# Patient Record
Sex: FEMALE | Race: OTHER | HISPANIC OR LATINO | URBAN - METROPOLITAN AREA
[De-identification: names, ages, dates, MRNs, and addresses within clinical notes are randomized per-mention and may not be internally consistent; named-entity substitution may affect disease eponyms.]

---

## 2021-12-27 ENCOUNTER — EMERGENCY (EMERGENCY)
Facility: HOSPITAL | Age: 36
LOS: 1 days | Discharge: HOME | End: 2021-12-27
Attending: EMERGENCY MEDICINE
Payer: MEDICAID

## 2021-12-27 VITALS — DIASTOLIC BLOOD PRESSURE: 53 MMHG | SYSTOLIC BLOOD PRESSURE: 101 MMHG | HEART RATE: 79 BPM | OXYGEN SATURATION: 98 %

## 2021-12-27 VITALS
TEMPERATURE: 100 F | RESPIRATION RATE: 18 BRPM | DIASTOLIC BLOOD PRESSURE: 59 MMHG | SYSTOLIC BLOOD PRESSURE: 98 MMHG | HEIGHT: 59 IN | HEART RATE: 70 BPM | WEIGHT: 100.09 LBS | OXYGEN SATURATION: 96 %

## 2021-12-27 LAB
ANION GAP SERPL CALC-SCNC: 12 MMOL/L — SIGNIFICANT CHANGE UP (ref 5–17)
BASOPHILS # BLD AUTO: 0 K/UL — SIGNIFICANT CHANGE UP (ref 0–0.2)
BASOPHILS NFR BLD AUTO: 0 % — SIGNIFICANT CHANGE UP (ref 0–2)
BUN SERPL-MCNC: 13 MG/DL — SIGNIFICANT CHANGE UP (ref 8–20)
CALCIUM SERPL-MCNC: 8.4 MG/DL — LOW (ref 8.6–10.2)
CHLORIDE SERPL-SCNC: 99 MMOL/L — SIGNIFICANT CHANGE UP (ref 98–107)
CO2 SERPL-SCNC: 24 MMOL/L — SIGNIFICANT CHANGE UP (ref 22–29)
CREAT SERPL-MCNC: 0.56 MG/DL — SIGNIFICANT CHANGE UP (ref 0.5–1.3)
EOSINOPHIL # BLD AUTO: 0 K/UL — SIGNIFICANT CHANGE UP (ref 0–0.5)
EOSINOPHIL NFR BLD AUTO: 0 % — SIGNIFICANT CHANGE UP (ref 0–6)
GLUCOSE SERPL-MCNC: 126 MG/DL — HIGH (ref 70–99)
HCG SERPL-ACNC: <4 MIU/ML — SIGNIFICANT CHANGE UP
HCT VFR BLD CALC: 35.4 % — SIGNIFICANT CHANGE UP (ref 34.5–45)
HGB BLD-MCNC: 12.2 G/DL — SIGNIFICANT CHANGE UP (ref 11.5–15.5)
IMM GRANULOCYTES NFR BLD AUTO: 0.4 % — SIGNIFICANT CHANGE UP (ref 0–1.5)
LYMPHOCYTES # BLD AUTO: 0.88 K/UL — LOW (ref 1–3.3)
LYMPHOCYTES # BLD AUTO: 18.1 % — SIGNIFICANT CHANGE UP (ref 13–44)
MCHC RBC-ENTMCNC: 30.2 PG — SIGNIFICANT CHANGE UP (ref 27–34)
MCHC RBC-ENTMCNC: 34.5 GM/DL — SIGNIFICANT CHANGE UP (ref 32–36)
MCV RBC AUTO: 87.6 FL — SIGNIFICANT CHANGE UP (ref 80–100)
MONOCYTES # BLD AUTO: 0.19 K/UL — SIGNIFICANT CHANGE UP (ref 0–0.9)
MONOCYTES NFR BLD AUTO: 3.9 % — SIGNIFICANT CHANGE UP (ref 2–14)
NEUTROPHILS # BLD AUTO: 3.77 K/UL — SIGNIFICANT CHANGE UP (ref 1.8–7.4)
NEUTROPHILS NFR BLD AUTO: 77.6 % — HIGH (ref 43–77)
PLATELET # BLD AUTO: 195 K/UL — SIGNIFICANT CHANGE UP (ref 150–400)
POTASSIUM SERPL-MCNC: 3.8 MMOL/L — SIGNIFICANT CHANGE UP (ref 3.5–5.3)
POTASSIUM SERPL-SCNC: 3.8 MMOL/L — SIGNIFICANT CHANGE UP (ref 3.5–5.3)
RBC # BLD: 4.04 M/UL — SIGNIFICANT CHANGE UP (ref 3.8–5.2)
RBC # FLD: 11.7 % — SIGNIFICANT CHANGE UP (ref 10.3–14.5)
SARS-COV-2 RNA SPEC QL NAA+PROBE: DETECTED
SODIUM SERPL-SCNC: 135 MMOL/L — SIGNIFICANT CHANGE UP (ref 135–145)
WBC # BLD: 4.86 K/UL — SIGNIFICANT CHANGE UP (ref 3.8–10.5)
WBC # FLD AUTO: 4.86 K/UL — SIGNIFICANT CHANGE UP (ref 3.8–10.5)

## 2021-12-27 PROCEDURE — U0003: CPT

## 2021-12-27 PROCEDURE — 93010 ELECTROCARDIOGRAM REPORT: CPT

## 2021-12-27 PROCEDURE — U0005: CPT

## 2021-12-27 PROCEDURE — 96374 THER/PROPH/DIAG INJ IV PUSH: CPT

## 2021-12-27 PROCEDURE — 99284 EMERGENCY DEPT VISIT MOD MDM: CPT | Mod: 25

## 2021-12-27 PROCEDURE — 85025 COMPLETE CBC W/AUTO DIFF WBC: CPT

## 2021-12-27 PROCEDURE — 80048 BASIC METABOLIC PNL TOTAL CA: CPT

## 2021-12-27 PROCEDURE — 36415 COLL VENOUS BLD VENIPUNCTURE: CPT

## 2021-12-27 PROCEDURE — 84702 CHORIONIC GONADOTROPIN TEST: CPT

## 2021-12-27 PROCEDURE — 71045 X-RAY EXAM CHEST 1 VIEW: CPT

## 2021-12-27 PROCEDURE — 93005 ELECTROCARDIOGRAM TRACING: CPT

## 2021-12-27 PROCEDURE — 71045 X-RAY EXAM CHEST 1 VIEW: CPT | Mod: 26

## 2021-12-27 PROCEDURE — 99284 EMERGENCY DEPT VISIT MOD MDM: CPT

## 2021-12-27 RX ORDER — KETOROLAC TROMETHAMINE 30 MG/ML
30 SYRINGE (ML) INJECTION ONCE
Refills: 0 | Status: DISCONTINUED | OUTPATIENT
Start: 2021-12-27 | End: 2021-12-27

## 2021-12-27 RX ORDER — SODIUM CHLORIDE 9 MG/ML
1000 INJECTION INTRAMUSCULAR; INTRAVENOUS; SUBCUTANEOUS ONCE
Refills: 0 | Status: COMPLETED | OUTPATIENT
Start: 2021-12-27 | End: 2021-12-27

## 2021-12-27 RX ADMIN — Medication 30 MILLIGRAM(S): at 04:08

## 2021-12-27 RX ADMIN — SODIUM CHLORIDE 1000 MILLILITER(S): 9 INJECTION INTRAMUSCULAR; INTRAVENOUS; SUBCUTANEOUS at 04:08

## 2021-12-27 NOTE — ED PROVIDER NOTE - PATIENT PORTAL LINK FT
You can access the FollowMyHealth Patient Portal offered by [facility] by registering at the following website: [email or web address]. By joining [employer]’s FollowMyHealth portal, you will also be able to view your health information using other applications (apps) compatible with our system.

## 2021-12-27 NOTE — ED PROVIDER NOTE - CARDIAC, MLM
Normal rate, regular rhythm.  Heart sounds S1, S2.  No murmurs, rubs or gallops. Normal rate, regular rhythm.  Heart sounds S1, S2.  No murmurs, rubs or gallops. No calf tenderness/swelling.

## 2021-12-27 NOTE — ED PROVIDER NOTE - ATTENDING CONTRIBUTION TO CARE
Clemencia: I performed a face to face bedside interview with patient regarding history of present illness, review of symptoms and past medical history. I completed an independent physical exam.  I have discussed patient's plan of care with advanced care provider.   I agree with note as stated above including HISTORY OF PRESENT ILLNESS, HIV, PAST MEDICAL/SURGICAL/FAMILY/SOCIAL HISTORY, ALLERGIES AND HOME MEDICATIONS, REVIEW OF SYSTEMS, PHYSICAL EXAM, MEDICAL DECISION MAKING and any PROGRESS NOTES during the time I functioned as the attending physician for this patient  unless otherwise noted. My brief assessment is as follows: 35 yo female no PMHx presents to ED c/o passed out. Patient febrile and with URI sxms. EKG, CXR, labs WNL. Nontoxic, well appearing. Patient to be discharged. Patient provided verbal/written discharge instructions and return precautions. Patient expresses understanding and agreement.

## 2021-12-27 NOTE — ED PROVIDER NOTE - NSFOLLOWUPINSTRUCTIONS_ED_ALL_ED_FT
- Ibuprofen 600mg every 6 hours as needed for fever.  - Acetaminophen 650mg every 6 hours as needed for fever.   - Increase fluids.   - Please bring all documentation from your ED visit to any related future follow up appointment.  - Please call to schedule follow up appointment with your primary care physician within 24-48 hours.  - Please seek immediate medical attention or return to the ED for any new/worsening, signs/symptoms, or concerns.    Feel better!     - Ibuprofeno 600 mg cada 6 horas según sea necesario para la fiebre.  - Acetaminofén 650 mg cada 6 horas según sea necesario para la fiebre.  - Incrementar los líquidos.  - Traiga toda la documentación de scott visita al servicio de urgencias a cualquier peterson de seguimiento futura relacionada.  - Llame para programar johanna peterson de seguimiento con scott médico de atención primaria dentro de las 24 a 48 horas.  - Busque atención médica inmediata o regrese al servicio de urgencias por cualquier signo / síntoma nuevo o que empeore, o si tiene alguna inquietud.    ¡Sentirse mejor!      Síncope    LO QUE NECESITA SABER:    ¿Qué es el síncope?El síncope también se conoce radha desmayo o pérdida de la consciencia. El síncope es la pérdida repentina y temporal de la consciencia, seguida por johanna caída estando en johanna posición de pie o sentado. Un episodio de síncope generalmente es corto.    ¿Cuáles son las causas del síncope?El síncope es provocado por johanna disminución del flujo sanguíneo hacia el cerebro. Cuando disminuye el flujo sanguíneo al cerebro, también disminuye el flujo del oxígeno hacia el cerebro. El síncope puede ser provocado por cualquiera de las siguientes condiciones:  •Johanna afección cardíaca, radha un estrechamiento de johanna arteria o johanna irregularidad en los latidos cardíacos      •Johanna afección médica, radha la anemia grave, la diabetes no controlada o un trastorno de los nervios      •Deshidratación      •Ciertos medicamentos, radha medicamentos para la presión arterial, medicamentos para el corazón o antidepresivos      •Problemas en los vasos sanguíneos del cerebro      •Un rápido descenso en la presión arterial después de un cambio de la posición corporal, radha pasar de estar acostado a estar sentado o de pie      •Realizar esfuerzo alisha las evacuaciones intestinales, toser o estornudar, o enfrentarse a johanna situación estresante o que genera temor      •Johanna problema médico que afecta los pulmones, radha la neumonía o el asma, o la hiperventilación (respirar demasiado rápido)      ¿Qué signos y síntomas podrían ocurrir antes del síncope?  •Piel fría, húmeda y sudorosa      •Respiración acelerada y un ritmo cardíaco acelerado, arjun      •Sentir más cansancio de lo usual      •Náuseas, johanna sensación de calor y sudoración      •Dolor de bimal o sensación de desvanecimiento o mareos      •Sensación de hormigueo o adormecimiento      •Manchas carol de los ojos, visión borrosa o visión doble      ¿Cómo se diagnostica la causa de un síncope?Scott médico lo examinará. Le preguntará si usted tiene alguna otra afección médica. Es posible que le pida los siguientes exámenes para encontrar la causa de los síntomas:   •Los análisis de sangrepueden hacerse para controlar los niveles de electrolitos, radha el sodio. Un problema en los niveles de los electrolitos puede provocar síncope.      •Telemetríaes un monitoreo continuo de scott ritmo cardíaco. Para flcao monitoreo a usted le colocarán parches adhesivos en la piel que a la vez se conectan a la máquina del ecocardiograma que graba scott ritmo cardíaco.      •Un ecocardiogramaes un tipo de ultrasonido. Se usan ondas sonoras para mostrar la estructura y función de scott corazón.      •Johanna prueba de esfuerzopuede llegar a mostrar los cambios que parvin lugar en scott corazón mientras está bajo estrés. Es probable que le apliquen el estrés a scott corazón usando ejercicios o medicamentos. Pida más información sobre esta prueba.      •Johanna prueba de johnson basculantese utiliza para verificar scott corazón y scott presión arterial cuando usted cambia de posición.      •Un monitor Holtertambién se llama monitor portátil de electrocardiografía (ECG). Muestra la actividad eléctrica de scott corazón mientras usted realiza venkatesh actividades normales. Es un pequeño dispositivo a pila que usted lleva consigo. Mostrará qué tan rápido late scott corazón y si late en un patrón regular. Scott médico podría recomendar esta prueba si tiene síncope frecuentemente en 2 o 3 días.  Monitor Holter           ¿Cómo se trata el síncope?El tratamiento depende de la causa de scott síncope. Para prevenir que el síncope suceda otra vez, es probable que usted necesite cualquiera de los siguientes:   •Los medicamentospodrían administrarse para ayudar a que scott corazón fer con fuerza y regularidad. Scott médico podría hacer cambios a cualquier medicamento que le esté causando síncope.      •El entrenamiento de inclinaciónrequiere que usted se entrene para ponerse de pie alisha 10 a 30 minutos contra johanna pared. [employer] ayuda a que scott cuerpo disminuya los efectos de los cambios de postura y reduce el número de desmayos.      ¿Qué puedo hacer para controlar los síncopes?  •Lleve un registro de los episodios de síncope.Incluya los síntomas y la actividad que realiza antes y después del episodio. El registro puede ayudar a scott médico a determinar la causa del síncope y ayudarlo a usted a controlar los episodios.      •Siéntese o acuéstese cuando sea necesario.[employer] incluye cuando se sienta mareado, scott garganta se cierre o note cambios en scott visión. Eleve venkatesh piernas por encima del nivel de scott corazón.      •Inhale lenta y profundamente si comienza a respirar más rápido a causa de la ansiedad o el temor.[employer] puede disminuir el mareo y la sensación de que se va a desmayar.      •Revise scott presión arterial con frecuencia.[employer] es importante si usted lorenzo medicamentos para bajar la presión arterial. Revise scott presión arterial cuando esté acostado y cuando esté de pie. Pregunte con que frecuencia debe tomarse la presión alisha el día. Mantenga un registro de los valores numéricos de scott presión arterial. Scott médico puede usar la información del registro radha johanna base para planificar scott tratamiento.  Cómo michele la presión arterial           ¿Qué puedo hacer para prevenir un episodio de síncope?  •Muévase lentamente y acostúmbrese a johanna posición antes de moverse a otra.[employer] es muy importante cuando usted se cambie de johanna posición acostada o sentada a johanna posición de pie. Respire profundo varias veces antes de ponerse de pie después de buster estado acostado. Póngase de pie lentamente. Los movimientos repentinos podrían causar desmayos. Siéntese en el borde de la cama o del sofá por unos minutos antes de ponerse de pie. En josefina que esté guardando cama, debe tratar de estar en johanna posición vertical por lo menos por 2 horas todos los días o radha se lo indicaron. No inmovilice las piernas cuando esté de pie alisha un louis periodo de tiempo. Mueva las piernas y doble las rodillas para mantener el flujo de paul.      •Siga las recomendaciones de scott médico.El médico puede recomendarle que ingiera más líquidos para evitar la deshidratación. Es probable que usted también deba aumentar scott consumo de sal para evitar que scott presión arterial baje demasiado y ocurra un síncope. El médico le dirá cuánto líquido y sodio debe consumir cada día. También le dirá cuánta actividad física es junior para usted. [employer] dependerá de la causa de scott síncope.      •Esté atento a los signos de bajo nivel de azúcar en la paul.Los signos incluyen hambre, nerviosismo, sudoración y latidos cardíacos rápidos o palpitantes. Consulte con scott médico sobre métodos para mantener estable scott nivel de azúcar en la paul.      •No se esfuerce si está estreñido.Puede desmayarse si usted hace fuerza para realizar johanna evacuación intestinal. Caminar es lo mejor que usted puede hacer para que venkatesh intestinos se muevan. Consuma alimentos ricos en fibra para facilitar las evacuaciones intestinales. Los cereales altos en fibra, los frijoles, las verduras y los panes integrales son buenos ejemplos. El jugo de ciruelas pasas también puede suavizar las evacuaciones intestinales.      •Tenga cuidado cuando hace calor.El calor puede causar un episodio de síncope. Limite la actividad que realiza al aire rancho en días calurosos. La actividad física en días calurosos puede conducir a la deshidratación. [employer] puede provocar un episodio.      ¿Cuándo starr buscar atención inmediata?  •Está sangrando porque se golpeó la bimal cuando se desmayó.      •Usted repentinamente tiene doble visión, dificultad para hablar, adormecimiento y no puede  venkatesh brazos o piernas.      •Usted tiene dolor en el pecho y dificultad para respirar.      •Usted vomita paul o un material que parece café molido.      •Usted ve paul en venkatesh deposiciones.      ¿Cuándo starr comunicarme con mi médico?  •Usted tiene nuevos síntomas o venkatesh síntomas empeoran.      •Tiene otro episodio de síncope.      •Tiene dolor de bimal, ritmo cardíaco acelerado o se siente demasiado mareado para ponerse pie.      •Usted tiene preguntas o inquietudes acerca de scott condición o cuidado.      ACUERDOS SOBRE SCOTT CUIDADO:    Usted tiene el derecho de ayudar a planear scott cuidado. Aprenda todo lo que pueda sobre scott condición y radha darle tratamiento. Discuta venkatesh opciones de tratamiento con venkatesh médicos para decidir el cuidado que usted desea recibir. Usted siempre tiene el derecho de rechazar el tratamiento.

## 2021-12-27 NOTE — ED PROVIDER NOTE - ADDITIONAL NOTES AND INSTRUCTIONS:
Pending COVID results. If positive, please follow current CDC guidelines.     Pendientes de resultados de COVID. Si es positivo, siga las pautas actuales de los CDC.

## 2021-12-27 NOTE — ED PROVIDER NOTE - CLINICAL SUMMARY MEDICAL DECISION MAKING FREE TEXT BOX
35 yo female no PMHx presents to ED c/o passed out. Patient febrile and with URI sxms. EKG, CXR, labs WNL. Nontoxic, well appearing. Patient to be discharged. Patient provided verbal/written discharge instructions and return precautions. Patient expresses understanding and agreement.

## 2021-12-27 NOTE — ED PROVIDER NOTE - OBJECTIVE STATEMENT
37 yo female no PMHx presents to ED c/o passed out. Prior to passing out had chills, witnessed by cousin, lasted 5 minutes. Reports viral sxms of cough, congestion, fever x4 days. Medicated with ibuprofen 400mg at 10am. Unvaccinated.   Denies OCP use, previous syncope, headache, head trauma, dizziness, LOC, chest pain, SOB, leg pain/swelling. 35 yo female no PMHx presents to ED c/o passed out. Prior to passing out had chills, witnessed by cousin, lasted 5 minutes. Reports viral sxms of cough, congestion, fever x4 days. Medicated with ibuprofen 400mg at 10am. Unvaccinated. No further complaints at this time.   Denies OCP use, previous syncope, headache, head trauma, dizziness, LOC, chest pain, SOB, leg pain/swelling.

## 2022-04-08 ENCOUNTER — EMERGENCY (EMERGENCY)
Facility: HOSPITAL | Age: 37
LOS: 1 days | Discharge: HOME | End: 2022-04-08
Attending: STUDENT IN AN ORGANIZED HEALTH CARE EDUCATION/TRAINING PROGRAM
Payer: MEDICAID

## 2022-04-08 VITALS
HEIGHT: 60 IN | WEIGHT: 100.09 LBS | SYSTOLIC BLOOD PRESSURE: 138 MMHG | OXYGEN SATURATION: 99 % | RESPIRATION RATE: 18 BRPM | TEMPERATURE: 98 F | HEART RATE: 72 BPM | DIASTOLIC BLOOD PRESSURE: 90 MMHG

## 2022-04-08 LAB
ALBUMIN SERPL ELPH-MCNC: 4.2 G/DL — SIGNIFICANT CHANGE UP (ref 3.3–5.2)
ALP SERPL-CCNC: 78 U/L — SIGNIFICANT CHANGE UP (ref 40–120)
ALT FLD-CCNC: 10 U/L — SIGNIFICANT CHANGE UP
ANION GAP SERPL CALC-SCNC: 16 MMOL/L — SIGNIFICANT CHANGE UP (ref 5–17)
APPEARANCE UR: CLEAR — SIGNIFICANT CHANGE UP
AST SERPL-CCNC: 13 U/L — SIGNIFICANT CHANGE UP
BASOPHILS # BLD AUTO: 0.03 K/UL — SIGNIFICANT CHANGE UP (ref 0–0.2)
BASOPHILS NFR BLD AUTO: 0.2 % — SIGNIFICANT CHANGE UP (ref 0–2)
BILIRUB SERPL-MCNC: 0.2 MG/DL — LOW (ref 0.4–2)
BILIRUB UR-MCNC: NEGATIVE — SIGNIFICANT CHANGE UP
BUN SERPL-MCNC: 13.6 MG/DL — SIGNIFICANT CHANGE UP (ref 8–20)
CALCIUM SERPL-MCNC: 8.7 MG/DL — SIGNIFICANT CHANGE UP (ref 8.6–10.2)
CHLORIDE SERPL-SCNC: 105 MMOL/L — SIGNIFICANT CHANGE UP (ref 98–107)
CO2 SERPL-SCNC: 21 MMOL/L — LOW (ref 22–29)
COLOR SPEC: YELLOW — SIGNIFICANT CHANGE UP
CREAT SERPL-MCNC: 0.78 MG/DL — SIGNIFICANT CHANGE UP (ref 0.5–1.3)
DIFF PNL FLD: ABNORMAL
EGFR: 100 ML/MIN/1.73M2 — SIGNIFICANT CHANGE UP
EOSINOPHIL # BLD AUTO: 0.01 K/UL — SIGNIFICANT CHANGE UP (ref 0–0.5)
EOSINOPHIL NFR BLD AUTO: 0.1 % — SIGNIFICANT CHANGE UP (ref 0–6)
GLUCOSE SERPL-MCNC: 126 MG/DL — HIGH (ref 70–99)
GLUCOSE UR QL: NEGATIVE MG/DL — SIGNIFICANT CHANGE UP
HCG SERPL-ACNC: <4 MIU/ML — SIGNIFICANT CHANGE UP
HCT VFR BLD CALC: 40.5 % — SIGNIFICANT CHANGE UP (ref 34.5–45)
HGB BLD-MCNC: 13.8 G/DL — SIGNIFICANT CHANGE UP (ref 11.5–15.5)
IMM GRANULOCYTES NFR BLD AUTO: 0.5 % — SIGNIFICANT CHANGE UP (ref 0–1.5)
KETONES UR-MCNC: ABNORMAL
LEUKOCYTE ESTERASE UR-ACNC: NEGATIVE — SIGNIFICANT CHANGE UP
LIDOCAIN IGE QN: 37 U/L — SIGNIFICANT CHANGE UP (ref 22–51)
LYMPHOCYTES # BLD AUTO: 1.51 K/UL — SIGNIFICANT CHANGE UP (ref 1–3.3)
LYMPHOCYTES # BLD AUTO: 11.5 % — LOW (ref 13–44)
MCHC RBC-ENTMCNC: 31.4 PG — SIGNIFICANT CHANGE UP (ref 27–34)
MCHC RBC-ENTMCNC: 34.1 GM/DL — SIGNIFICANT CHANGE UP (ref 32–36)
MCV RBC AUTO: 92 FL — SIGNIFICANT CHANGE UP (ref 80–100)
MONOCYTES # BLD AUTO: 0.42 K/UL — SIGNIFICANT CHANGE UP (ref 0–0.9)
MONOCYTES NFR BLD AUTO: 3.2 % — SIGNIFICANT CHANGE UP (ref 2–14)
NEUTROPHILS # BLD AUTO: 11.12 K/UL — HIGH (ref 1.8–7.4)
NEUTROPHILS NFR BLD AUTO: 84.5 % — HIGH (ref 43–77)
NITRITE UR-MCNC: NEGATIVE — SIGNIFICANT CHANGE UP
PH UR: 6 — SIGNIFICANT CHANGE UP (ref 5–8)
PLATELET # BLD AUTO: 345 K/UL — SIGNIFICANT CHANGE UP (ref 150–400)
POTASSIUM SERPL-MCNC: 3.9 MMOL/L — SIGNIFICANT CHANGE UP (ref 3.5–5.3)
POTASSIUM SERPL-SCNC: 3.9 MMOL/L — SIGNIFICANT CHANGE UP (ref 3.5–5.3)
PROT SERPL-MCNC: 7.6 G/DL — SIGNIFICANT CHANGE UP (ref 6.6–8.7)
PROT UR-MCNC: 15
RBC # BLD: 4.4 M/UL — SIGNIFICANT CHANGE UP (ref 3.8–5.2)
RBC # FLD: 12.6 % — SIGNIFICANT CHANGE UP (ref 10.3–14.5)
SODIUM SERPL-SCNC: 142 MMOL/L — SIGNIFICANT CHANGE UP (ref 135–145)
SP GR SPEC: 1.02 — SIGNIFICANT CHANGE UP (ref 1.01–1.02)
UROBILINOGEN FLD QL: NEGATIVE MG/DL — SIGNIFICANT CHANGE UP
WBC # BLD: 13.15 K/UL — HIGH (ref 3.8–10.5)
WBC # FLD AUTO: 13.15 K/UL — HIGH (ref 3.8–10.5)

## 2022-04-08 PROCEDURE — 81001 URINALYSIS AUTO W/SCOPE: CPT

## 2022-04-08 PROCEDURE — 87086 URINE CULTURE/COLONY COUNT: CPT

## 2022-04-08 PROCEDURE — 36415 COLL VENOUS BLD VENIPUNCTURE: CPT

## 2022-04-08 PROCEDURE — 76830 TRANSVAGINAL US NON-OB: CPT | Mod: 26

## 2022-04-08 PROCEDURE — 76830 TRANSVAGINAL US NON-OB: CPT

## 2022-04-08 PROCEDURE — G1004: CPT

## 2022-04-08 PROCEDURE — 84702 CHORIONIC GONADOTROPIN TEST: CPT

## 2022-04-08 PROCEDURE — 99284 EMERGENCY DEPT VISIT MOD MDM: CPT | Mod: 25

## 2022-04-08 PROCEDURE — 83690 ASSAY OF LIPASE: CPT

## 2022-04-08 PROCEDURE — 76856 US EXAM PELVIC COMPLETE: CPT

## 2022-04-08 PROCEDURE — 85025 COMPLETE CBC W/AUTO DIFF WBC: CPT

## 2022-04-08 PROCEDURE — 80053 COMPREHEN METABOLIC PANEL: CPT

## 2022-04-08 PROCEDURE — 74176 CT ABD & PELVIS W/O CONTRAST: CPT | Mod: ME

## 2022-04-08 PROCEDURE — 96375 TX/PRO/DX INJ NEW DRUG ADDON: CPT

## 2022-04-08 PROCEDURE — 76856 US EXAM PELVIC COMPLETE: CPT | Mod: 26

## 2022-04-08 PROCEDURE — 99285 EMERGENCY DEPT VISIT HI MDM: CPT

## 2022-04-08 PROCEDURE — 96374 THER/PROPH/DIAG INJ IV PUSH: CPT

## 2022-04-08 PROCEDURE — 74176 CT ABD & PELVIS W/O CONTRAST: CPT | Mod: 26,ME

## 2022-04-08 RX ORDER — OXYCODONE AND ACETAMINOPHEN 5; 325 MG/1; MG/1
1 TABLET ORAL
Qty: 8 | Refills: 0
Start: 2022-04-08 | End: 2022-04-09

## 2022-04-08 RX ORDER — KETOROLAC TROMETHAMINE 30 MG/ML
30 SYRINGE (ML) INJECTION ONCE
Refills: 0 | Status: COMPLETED | OUTPATIENT
Start: 2022-04-08 | End: 2022-04-08

## 2022-04-08 RX ORDER — MORPHINE SULFATE 50 MG/1
4 CAPSULE, EXTENDED RELEASE ORAL ONCE
Refills: 0 | Status: DISCONTINUED | OUTPATIENT
Start: 2022-04-08 | End: 2022-04-08

## 2022-04-08 RX ORDER — ONDANSETRON 8 MG/1
4 TABLET, FILM COATED ORAL ONCE
Refills: 0 | Status: COMPLETED | OUTPATIENT
Start: 2022-04-08 | End: 2022-04-08

## 2022-04-08 RX ORDER — TAMSULOSIN HYDROCHLORIDE 0.4 MG/1
1 CAPSULE ORAL
Qty: 7 | Refills: 0
Start: 2022-04-08 | End: 2022-04-14

## 2022-04-08 RX ORDER — SODIUM CHLORIDE 9 MG/ML
1000 INJECTION INTRAMUSCULAR; INTRAVENOUS; SUBCUTANEOUS ONCE
Refills: 0 | Status: COMPLETED | OUTPATIENT
Start: 2022-04-08 | End: 2022-04-08

## 2022-04-08 RX ORDER — IBUPROFEN 200 MG
1 TABLET ORAL
Qty: 28 | Refills: 0
Start: 2022-04-08 | End: 2022-04-14

## 2022-04-08 RX ADMIN — SODIUM CHLORIDE 1000 MILLILITER(S): 9 INJECTION INTRAMUSCULAR; INTRAVENOUS; SUBCUTANEOUS at 10:56

## 2022-04-08 RX ADMIN — ONDANSETRON 4 MILLIGRAM(S): 8 TABLET, FILM COATED ORAL at 10:57

## 2022-04-08 RX ADMIN — MORPHINE SULFATE 4 MILLIGRAM(S): 50 CAPSULE, EXTENDED RELEASE ORAL at 10:57

## 2022-04-08 NOTE — ED PROVIDER NOTE - PROGRESS NOTE DETAILS
PT evaluated by intake physician. HPI/PE/ROS as noted above. Will follow up plan per intake physician. pt with L sided abd pain radiating to the back. Awaiting results. pt feeling better after pain meds. Pt has 1.3 cm ovarian cyst on L as well as 3mm renal stone. Pt advised of results. She is feeling a little better. Pt advised she needs f/u with GYN and urology. Will send pain meds and flomax to pharmacy. Pt given return precautions.

## 2022-04-08 NOTE — ED PROVIDER NOTE - PHYSICAL EXAMINATION
Constitutional - well-developed. Head - NCAT. Airway patent. Eyes - PERRL. CV - RRR. no murmur. no edema. Pulm - CTAB. Abd - soft, LLQ ttp. no rebound. no guarding. Neuro - A&Ox3. strength 5/5 x4. sensation intact x4. normal gait. Skin - No rash. MSK - normal ROM.

## 2022-04-08 NOTE — ED PROVIDER NOTE - OBJECTIVE STATEMENT
Pt is a 35 yo F co LLQ abd pain. PT states that at 7 AM she had onset of severe LLQ abd pain that radiates to the L low back. PT states that the pain is cramping, severe. patient states that it is worse with movement.  Pt states that the pain has caused her to vomit several times. never had this pain before. no alleviating factors.  LMP was 3-28.  no other complaints.

## 2022-04-08 NOTE — ED PROVIDER NOTE - CARE PROVIDER_API CALL
Stan Arellano)  Urology  [address], [address]  [place] [address]  Phone: [phone]  Fax: [phone]  Follow Up Time:

## 2022-04-08 NOTE — ED PROVIDER NOTE - NS ED ATTENDING STATEMENT MOD
This was a shared visit with the LUCILA. I reviewed and verified the documentation and independently performed the documented:

## 2022-04-08 NOTE — ED PROVIDER NOTE - ATTENDING CONTRIBUTION TO CARE
I performed a face to face history and physical exam of the patient and discussed their management with the student/resident/ACP. I reviewed the student/resident/ACP's note and agree with the documented findings and plan of care.    labs and imaging reviewed. Pt with ureteral stone causing pain.  pain controlled.  Pt feeling better. pt given return instructions and instructed to f/up with urology.

## 2022-04-08 NOTE — ED PROVIDER NOTE - WET READ LAUNCH FT
From: Angelique Porras  To: Adonis Hunter  Sent: 11/15/2021 6:29 AM CST  Subject: Duloxetine    Should I be taking Duloxetine? Tanya Hoffman/PA nurse for Dr haynes does not have it on my list of medications. Will be on Prednisone till approx Dante.    Hope it's a good day/week.  angelique   There are no Wet Read(s) to document.

## 2022-04-08 NOTE — ED ADULT NURSE NOTE - NSIMPLEMENTINTERV_GEN_ALL_ED
Implemented All Universal Safety Interventions:  [place] to call system. Call bell, personal items and telephone within reach. Instruct patient to call for assistance. Room bathroom lighting operational. Non-slip footwear when patient is off stretcher. Physically safe environment: no spills, clutter or unnecessary equipment. Stretcher in lowest position, wheels locked, appropriate side rails in place.

## 2022-04-08 NOTE — ED ADULT NURSE NOTE - OBJECTIVE STATEMENT
pt comes to ED with reports of severe sudden onset LLQ abd pain. pt awake alert and oriented, no urinary complaints, no hematuria. pt states no vomiting, intermittent nausea. skin warm dry and intact.

## 2022-04-08 NOTE — ED PROVIDER NOTE - NSFOLLOWUPINSTRUCTIONS_ED_ALL_ED_FT
Follow up with GYN.  Follow up with urology.  Take medication as prescribed.  Come back with new or worsening symptoms.    Kidney Stones    Kidney stones (urolithiasis) are crystal deposits that form inside your kidneys. Pain is caused by the stone moving through the urinary tract, causing spasms of the ureter. Drink enough water and fluids to keep your urine clear or pale yellow. This will help you to pass the stone or stone fragments. If provided a strainer, strain all urine and keep all particulate matter and stones for a follow up appointment with a urologist.    SEEK IMMEDIATE MEDICAL CARE IF YOU HAVE ANY OF THE FOLLOWING SYMPTOMS: pain not controlled with medication, fever/chills, worsening vomiting, inability to urinate, or dizziness/lightheadedness.

## 2022-04-08 NOTE — ED PROVIDER NOTE - NS ED ROS FT
No fever/chills, No photophobia/eye pain/changes in vision, No ear pain/sore throat/dysphagia, No chest pain/palpitations, no SOB/cough/wheeze/stridor, +abdominal pain, + N/V no Diarrhea, no dysuria/frequency/discharge, No neck/back pain, no rash, no changes in neurological status/function.

## 2022-04-09 LAB
CULTURE RESULTS: SIGNIFICANT CHANGE UP
SPECIMEN SOURCE: SIGNIFICANT CHANGE UP

## 2024-04-09 ENCOUNTER — INPATIENT (INPATIENT)
Facility: HOSPITAL | Age: 39
LOS: 1 days | Discharge: HOME | End: 2024-04-11
Attending: OBSTETRICS & GYNECOLOGY | Admitting: OBSTETRICS & GYNECOLOGY
Payer: COMMERCIAL

## 2024-04-09 VITALS — DIASTOLIC BLOOD PRESSURE: 73 MMHG | SYSTOLIC BLOOD PRESSURE: 118 MMHG | HEART RATE: 96 BPM

## 2024-04-09 DIAGNOSIS — O26.893 OTHER SPECIFIED PREGNANCY RELATED CONDITIONS, THIRD TRIMESTER: ICD-10-CM

## 2024-04-09 DIAGNOSIS — O26.899 OTHER SPECIFIED PREGNANCY RELATED CONDITIONS, UNSPECIFIED TRIMESTER: ICD-10-CM

## 2024-04-09 LAB
ABO RH CONFIRMATION: SIGNIFICANT CHANGE UP
BASOPHILS # BLD AUTO: 0.02 K/UL — SIGNIFICANT CHANGE UP (ref 0–0.2)
BASOPHILS NFR BLD AUTO: 0.3 % — SIGNIFICANT CHANGE UP (ref 0–2)
BLD GP AB SCN SERPL QL: SIGNIFICANT CHANGE UP
EOSINOPHIL # BLD AUTO: 0.03 K/UL — SIGNIFICANT CHANGE UP (ref 0–0.5)
EOSINOPHIL NFR BLD AUTO: 0.4 % — SIGNIFICANT CHANGE UP (ref 0–6)
HCT VFR BLD CALC: 33.7 % — LOW (ref 34.5–45)
HGB BLD-MCNC: 11.2 G/DL — LOW (ref 11.5–15.5)
IMM GRANULOCYTES NFR BLD AUTO: 0.8 % — SIGNIFICANT CHANGE UP (ref 0–0.9)
LYMPHOCYTES # BLD AUTO: 1.37 K/UL — SIGNIFICANT CHANGE UP (ref 1–3.3)
LYMPHOCYTES # BLD AUTO: 17.9 % — SIGNIFICANT CHANGE UP (ref 13–44)
MCHC RBC-ENTMCNC: 27.3 PG — SIGNIFICANT CHANGE UP (ref 27–34)
MCHC RBC-ENTMCNC: 33.2 GM/DL — SIGNIFICANT CHANGE UP (ref 32–36)
MCV RBC AUTO: 82 FL — SIGNIFICANT CHANGE UP (ref 80–100)
MONOCYTES # BLD AUTO: 0.69 K/UL — SIGNIFICANT CHANGE UP (ref 0–0.9)
MONOCYTES NFR BLD AUTO: 9 % — SIGNIFICANT CHANGE UP (ref 2–14)
NEUTROPHILS # BLD AUTO: 5.47 K/UL — SIGNIFICANT CHANGE UP (ref 1.8–7.4)
NEUTROPHILS NFR BLD AUTO: 71.6 % — SIGNIFICANT CHANGE UP (ref 43–77)
PLATELET # BLD AUTO: 332 K/UL — SIGNIFICANT CHANGE UP (ref 150–400)
RBC # BLD: 4.11 M/UL — SIGNIFICANT CHANGE UP (ref 3.8–5.2)
RBC # FLD: 16.2 % — HIGH (ref 10.3–14.5)
WBC # BLD: 7.64 K/UL — SIGNIFICANT CHANGE UP (ref 3.8–10.5)
WBC # FLD AUTO: 7.64 K/UL — SIGNIFICANT CHANGE UP (ref 3.8–10.5)

## 2024-04-09 RX ORDER — OXYTOCIN 10 UNIT/ML
41.67 VIAL (ML) INJECTION
Qty: 20 | Refills: 0 | Status: DISCONTINUED | OUTPATIENT
Start: 2024-04-09 | End: 2024-04-11

## 2024-04-09 RX ORDER — MEDROXYPROGESTERONE ACETATE 150 MG/ML
150 INJECTION, SUSPENSION, EXTENDED RELEASE INTRAMUSCULAR ONCE
Refills: 0 | Status: COMPLETED | OUTPATIENT
Start: 2024-04-10 | End: 2024-04-10

## 2024-04-09 RX ORDER — AER TRAVELER 0.5 G/1
1 SOLUTION RECTAL; TOPICAL EVERY 4 HOURS
Refills: 0 | Status: DISCONTINUED | OUTPATIENT
Start: 2024-04-09 | End: 2024-04-11

## 2024-04-09 RX ORDER — BENZOCAINE 10 %
1 GEL (GRAM) MUCOUS MEMBRANE EVERY 6 HOURS
Refills: 0 | Status: DISCONTINUED | OUTPATIENT
Start: 2024-04-09 | End: 2024-04-11

## 2024-04-09 RX ORDER — DIBUCAINE 1 %
1 OINTMENT (GRAM) RECTAL EVERY 6 HOURS
Refills: 0 | Status: DISCONTINUED | OUTPATIENT
Start: 2024-04-09 | End: 2024-04-11

## 2024-04-09 RX ORDER — SODIUM CHLORIDE 9 MG/ML
1000 INJECTION, SOLUTION INTRAVENOUS
Refills: 0 | Status: DISCONTINUED | OUTPATIENT
Start: 2024-04-09 | End: 2024-04-09

## 2024-04-09 RX ORDER — DIPHENHYDRAMINE HCL 50 MG
25 CAPSULE ORAL EVERY 6 HOURS
Refills: 0 | Status: DISCONTINUED | OUTPATIENT
Start: 2024-04-09 | End: 2024-04-11

## 2024-04-09 RX ORDER — ACETAMINOPHEN 500 MG
975 TABLET ORAL
Refills: 0 | Status: DISCONTINUED | OUTPATIENT
Start: 2024-04-09 | End: 2024-04-11

## 2024-04-09 RX ORDER — KETOROLAC TROMETHAMINE 30 MG/ML
30 SYRINGE (ML) INJECTION ONCE
Refills: 0 | Status: DISCONTINUED | OUTPATIENT
Start: 2024-04-09 | End: 2024-04-09

## 2024-04-09 RX ORDER — OXYTOCIN 10 UNIT/ML
333.33 VIAL (ML) INJECTION
Qty: 20 | Refills: 0 | Status: DISCONTINUED | OUTPATIENT
Start: 2024-04-09 | End: 2024-04-11

## 2024-04-09 RX ORDER — CHLORHEXIDINE GLUCONATE 213 G/1000ML
1 SOLUTION TOPICAL DAILY
Refills: 0 | Status: DISCONTINUED | OUTPATIENT
Start: 2024-04-09 | End: 2024-04-09

## 2024-04-09 RX ORDER — MAGNESIUM HYDROXIDE 400 MG/1
30 TABLET, CHEWABLE ORAL
Refills: 0 | Status: DISCONTINUED | OUTPATIENT
Start: 2024-04-09 | End: 2024-04-11

## 2024-04-09 RX ORDER — TETANUS TOXOID, REDUCED DIPHTHERIA TOXOID AND ACELLULAR PERTUSSIS VACCINE, ADSORBED 5; 2.5; 8; 8; 2.5 [IU]/.5ML; [IU]/.5ML; UG/.5ML; UG/.5ML; UG/.5ML
0.5 SUSPENSION INTRAMUSCULAR ONCE
Refills: 0 | Status: DISCONTINUED | OUTPATIENT
Start: 2024-04-09 | End: 2024-04-11

## 2024-04-09 RX ORDER — SODIUM CHLORIDE 9 MG/ML
3 INJECTION INTRAMUSCULAR; INTRAVENOUS; SUBCUTANEOUS EVERY 8 HOURS
Refills: 0 | Status: DISCONTINUED | OUTPATIENT
Start: 2024-04-09 | End: 2024-04-11

## 2024-04-09 RX ORDER — SIMETHICONE 80 MG/1
80 TABLET, CHEWABLE ORAL EVERY 4 HOURS
Refills: 0 | Status: DISCONTINUED | OUTPATIENT
Start: 2024-04-09 | End: 2024-04-11

## 2024-04-09 RX ORDER — IBUPROFEN 200 MG
600 TABLET ORAL EVERY 6 HOURS
Refills: 0 | Status: COMPLETED | OUTPATIENT
Start: 2024-04-09 | End: 2025-03-08

## 2024-04-09 RX ORDER — HYDROCORTISONE 1 %
1 OINTMENT (GRAM) TOPICAL EVERY 6 HOURS
Refills: 0 | Status: DISCONTINUED | OUTPATIENT
Start: 2024-04-09 | End: 2024-04-11

## 2024-04-09 RX ORDER — OXYCODONE HYDROCHLORIDE 5 MG/1
5 TABLET ORAL ONCE
Refills: 0 | Status: DISCONTINUED | OUTPATIENT
Start: 2024-04-09 | End: 2024-04-11

## 2024-04-09 RX ORDER — AMPICILLIN TRIHYDRATE 250 MG
2 CAPSULE ORAL ONCE
Refills: 0 | Status: COMPLETED | OUTPATIENT
Start: 2024-04-09 | End: 2024-04-09

## 2024-04-09 RX ORDER — PRAMOXINE HYDROCHLORIDE 150 MG/15G
1 AEROSOL, FOAM RECTAL EVERY 4 HOURS
Refills: 0 | Status: DISCONTINUED | OUTPATIENT
Start: 2024-04-09 | End: 2024-04-11

## 2024-04-09 RX ORDER — CITRIC ACID/SODIUM CITRATE 300-500 MG
30 SOLUTION, ORAL ORAL ONCE
Refills: 0 | Status: DISCONTINUED | OUTPATIENT
Start: 2024-04-09 | End: 2024-04-09

## 2024-04-09 RX ORDER — LANOLIN
1 OINTMENT (GRAM) TOPICAL EVERY 6 HOURS
Refills: 0 | Status: DISCONTINUED | OUTPATIENT
Start: 2024-04-09 | End: 2024-04-11

## 2024-04-09 RX ORDER — AMPICILLIN TRIHYDRATE 250 MG
1 CAPSULE ORAL EVERY 4 HOURS
Refills: 0 | Status: DISCONTINUED | OUTPATIENT
Start: 2024-04-09 | End: 2024-04-09

## 2024-04-09 RX ORDER — OXYCODONE HYDROCHLORIDE 5 MG/1
5 TABLET ORAL
Refills: 0 | Status: DISCONTINUED | OUTPATIENT
Start: 2024-04-09 | End: 2024-04-11

## 2024-04-09 RX ADMIN — Medication 30 MILLIGRAM(S): at 20:40

## 2024-04-09 RX ADMIN — SODIUM CHLORIDE 125 MILLILITER(S): 9 INJECTION, SOLUTION INTRAVENOUS at 16:35

## 2024-04-09 RX ADMIN — Medication 975 MILLIGRAM(S): at 22:47

## 2024-04-09 RX ADMIN — Medication 975 MILLIGRAM(S): at 22:49

## 2024-04-09 RX ADMIN — Medication 125 MILLIUNIT(S)/MIN: at 20:05

## 2024-04-09 RX ADMIN — Medication 200 GRAM(S): at 17:04

## 2024-04-09 NOTE — OB PROVIDER H&P - ASSESSMENT
Pt is a 39yo  at 39w5d c/w second trimester sono presenting with painful contractions as well as report of vaginal bleeding.    -pt AMA, in early labor  -Admit to L&D  -Consent  -Admission labs  -IV fluids  -Continuous toco and fetal monitoring   -GBS: pos; GBS ppx required   -Analgesia: pt considering pain medication at this time  -Augment PRN    Discussed with Dr. Ribera

## 2024-04-09 NOTE — OB PROVIDER H&P - NSLOWPPHRISK_OBGYN_A_OB
No previous uterine incision/Ellis Pregnancy/Less than or equal to 4 previous vaginal births/No known bleeding disorder/No history of postpartum hemorrhage/No other PPH risks indicated

## 2024-04-09 NOTE — OB PROVIDER H&P - NSHPPHYSICALEXAM_GEN_ALL_CORE
Vital Signs Last 24 Hrs  HR: 96 (09 Apr 2024 16:06) (96 - 96)  BP: 118/73 (09 Apr 2024 16:06) (118/73 - 118/73)    Gen: no acute distress  CV: regular rate and rhythm  Pulm: clear bilaterally to auscultation  Abd: nontender; gravid  Ext: no calf tenderness; +1 swelling     Tracing: baseline 155, moderate variability, no accels, no decels  [place]: Ctx regular  SVE: 3.5/70/-3      SSE: bloody show, no active vaginal bleeding  Ultrasound: vtx

## 2024-04-09 NOTE — OB PROVIDER DELIVERY SUMMARY - NSPROVIDERDELIVERYNOTE_OBGYN_ALL_OB_FT
Vaginal Delivery Summary    Procedure: Normal spontaneous vaginal delivery   Findings: Viable female infant delivered in cephalic presentation at , placenta delivered at .  Apgar scores 9/9.   Weight will be recorded after 1 hour to allow for skin-to-skin contact.  Laceration(s): 1st degree perineal  Repair: vicryl  EBL: 87cc  Complications: Uncomplicated    Procedure:   Patient felt rectal pressure and was found to be fully dilated, +2 station. She pushed effectively. She delivered a viable female infant. Delayed cord clamping was performed for 30 seconds. Placenta delivered intact and pitocin was started at the delivery of baby. Perineum and vagina were inspected, first degree laceration present and repaired. Adequate hemostasis was obtained. Fundus was noted to be firm.

## 2024-04-09 NOTE — OB RN PATIENT PROFILE - NS_GESTAGE_OBGYN_ALL_OB_FT
Patient currently lives at home with her son with 24 hour care from [facility]. 39w5d

## 2024-04-09 NOTE — OB PROVIDER H&P - HISTORY OF PRESENT ILLNESS
Pt is a 39yo  at 39w5d c/w second trimester sono presenting with painful contractions as well as report of vaginal bleeding.  Denies fevers/chills/nausea/vomiting/lightheadedness/headache/chest pain/shortness of breath/changes in urination or bowel movements.     LMP 23  DAVID: 24    Pregnancy Course:   late transfer of care  quantiferon indeterminate  GBS positive    OB Hx:  11/10/17  FT 40wks - reports uncomplicated    PGynHx: denies, hx  PMHx: denies  PSHx: denies  Meds: PNV  All: nkda  PSocHx: lives with  and her mom, has one son. Denies alcohol, rec drugs, smoking. She is currently unemployed

## 2024-04-09 NOTE — OB PROVIDER DELIVERY SUMMARY - NSSELHIDDEN_OBGYN_ALL_OB_FT
[[email or web address]_OB_FT:[id number]],[NS_DeliveryAssist1_OBGYN_ALL_OB_FT:[id number]],[NS_DeliveryRN_OBGYN_ALL_OB_FT:[id number]],[NS_DeliveryAssist2_OBGYN_ALL_OB_FT:[id number]]

## 2024-04-09 NOTE — OB PROVIDER LABOR PROGRESS NOTE - ASSESSMENT
Cat 2 tracing -> recovered to Cat 1 with repositioning  - Patient previously AROMed  - Expt management  - continuous monitoring       D/w Dr. Ribera

## 2024-04-09 NOTE — OB RN PATIENT PROFILE - COMFORT/ACCEPTABLE PAIN LEVEL (0-10)
Beth Mccallum  : 1965  Primary: [facility]  Secondary:  [place] [address]  [address]  [facility] [address]  Phone: [phone]  Fax: [phone] Plan Frequency: 2x/week for 6 weeks    Plan of Care/Certification Expiration Date: 24        Plan of Care/Certification Expiration Date:  Plan of Care/Certification Expiration Date: 24    Frequency/Duration: Plan Frequency: 2x/week for 6 weeks      Time In/Out:   Time In: 08  Time Out: 08      PT Visit Info:    Plan Frequency: 2x/week for 6 weeks  Progress Note Due Date: 24      Visit Count:  1                OUTPATIENT PHYSICAL THERAPY:             Initial Assessment 2024               Episode (Lumbar Spine)         Treatment Diagnosis:     Other low back pain  Pain in left hip  Medical/Referring Diagnosis:    Spondylolisthesis of lumbar region  Facet arthropathy, lumbar  Lumbar radiculopathy    Referring Physician:  Joaquina Cohen PA-C MD Orders:  PT Eval and Treat   Return MD Appt:  2024  Date of Onset:  Onset Date: 20     Allergies:  Influenza vaccines  Restrictions/Precautions:    None      Medications Last Reviewed:  2024     SUBJECTIVE   History of Injury/Illness (Reason for Referral):  Pt reports she's had 4 year history of L sided low back pain that began after moving a drawer back in .  Pt reports she's had pain off and on with functional activities such as vacuuming, prolonged walking, cleaning dishes, cooking, etc.  Pt reports she had surgery for appendectomy in , which has limited core strength.  Pt   Patient Stated Goal(s):  \"Reduce pain and increase functional activities\"  Initial Pain Level:      710   Post Session Pain Level:     5/10  Past Medical History/Comorbidities:   Ms. Mccallum  has no past medical history on file.  Ms. Mccallum  has no past surgical history on file.  Social History/Living Environment:   Patient lives with their family  Level of 
9

## 2024-04-09 NOTE — OB RN DELIVERY SUMMARY - NS_SEPSISRSKCALC_OBGYN_ALL_OB_FT
EOS calculated successfully. EOS Risk Factor: 0.5/1000 live births ([facility] national incidence); GA=39w5d; Temp=98.4; ROM=2.133; GBS='Negative'; Antibiotics='No antibiotics or any antibiotics < 2 hrs prior to birth'

## 2024-04-09 NOTE — OB RN DELIVERY SUMMARY - NSSELHIDDEN_OBGYN_ALL_OB_FT
[NS_[email or web address]_OB_FT:[id number]],[NS_DeliveryAssist1_OBGYN_ALL_OB_FT:[id number]],[NS_DeliveryRN_OBGYN_ALL_OB_FT:[id number]]

## 2024-04-09 NOTE — OB RN PATIENT PROFILE - FALL HARM RISK - UNIVERSAL INTERVENTIONS
Bed in lowest position, wheels locked, appropriate side rails in place/Call bell, personal items and telephone in reach/Instruct patient to call for assistance before getting out of bed or chair/Non-slip footwear when patient is out of bed/[place] to call system/Physically safe environment - no spills, clutter or unnecessary equipment/Purposeful Proactive Rounding/Room/bathroom lighting operational, light cord in reach

## 2024-04-10 LAB
HCT VFR BLD CALC: 27.5 % — LOW (ref 34.5–45)
HGB BLD-MCNC: 9 G/DL — LOW (ref 11.5–15.5)
T PALLIDUM AB TITR SER: NEGATIVE — SIGNIFICANT CHANGE UP

## 2024-04-10 RX ORDER — IBUPROFEN 200 MG
600 TABLET ORAL EVERY 6 HOURS
Refills: 0 | Status: DISCONTINUED | OUTPATIENT
Start: 2024-04-10 | End: 2024-04-11

## 2024-04-10 RX ADMIN — Medication 975 MILLIGRAM(S): at 15:14

## 2024-04-10 RX ADMIN — Medication 1 TABLET(S): at 13:17

## 2024-04-10 RX ADMIN — MEDROXYPROGESTERONE ACETATE 150 MILLIGRAM(S): 150 INJECTION, SUSPENSION, EXTENDED RELEASE INTRAMUSCULAR at 15:14

## 2024-04-10 RX ADMIN — Medication 975 MILLIGRAM(S): at 04:09

## 2024-04-10 RX ADMIN — Medication 975 MILLIGRAM(S): at 22:10

## 2024-04-10 RX ADMIN — Medication 975 MILLIGRAM(S): at 04:17

## 2024-04-10 RX ADMIN — Medication 600 MILLIGRAM(S): at 05:57

## 2024-04-10 RX ADMIN — Medication 600 MILLIGRAM(S): at 06:02

## 2024-04-10 RX ADMIN — Medication 600 MILLIGRAM(S): at 18:01

## 2024-04-10 RX ADMIN — Medication 600 MILLIGRAM(S): at 13:17

## 2024-04-10 RX ADMIN — Medication 975 MILLIGRAM(S): at 09:30

## 2024-04-10 NOTE — PROGRESS NOTE ADULT - SUBJECTIVE AND OBJECTIVE BOX
38yp  s/p  on     no complaints    ICU Vital Signs Last 24 Hrs  T(C): 36.6 (10 Apr 2024 04:05), Max: 36.9 (2024 17:03)  T(F): 97.9 (10 Apr 2024 04:05), Max: 98.4 (2024 17:03)  HR: 87 (10 Apr 2024 04:05) (72 - 104)  BP: 129/79 (10 Apr 2024 04:05) (118/73 - 140/74)  BP(mean): --  ABP: --  ABP(mean): --  RR: 18 (10 Apr 2024 04:05) (18 - 20)  SpO2: 96% (10 Apr 2024 04:05) (96% - 97%)    O2 Parameters below as of 2024 22:40  Patient On (Oxygen Delivery Method): room air    exam wnl                          9.0    x     )-----------( x        ( 10 Apr 2024 05:10 )             27.5   CBC Full  -  ( 10 Apr 2024 05:10 )  WBC Count : x  RBC Count : x  Hemoglobin : 9.0 g/dL  Hematocrit : 27.5 %  Platelet Count - Automated : x  Mean Cell Volume : x  Mean Cell Hemoglobin : x  Mean Cell Hemoglobin Concentration : x  Auto Neutrophil # : x  Auto Lymphocyte # : x  Auto Monocyte # : x  Auto Eosinophil # : x  Auto Basophil # : x  Auto Neutrophil % : x  Auto Lymphocyte % : x  Auto Monocyte % : x  Auto Eosinophil % : x  Auto Basophil % : x

## 2024-04-11 VITALS
SYSTOLIC BLOOD PRESSURE: 129 MMHG | TEMPERATURE: 98 F | DIASTOLIC BLOOD PRESSURE: 84 MMHG | OXYGEN SATURATION: 97 % | RESPIRATION RATE: 18 BRPM | HEART RATE: 87 BPM

## 2024-04-11 PROCEDURE — 36415 COLL VENOUS BLD VENIPUNCTURE: CPT

## 2024-04-11 PROCEDURE — 76815 OB US LIMITED FETUS(S): CPT

## 2024-04-11 PROCEDURE — 86850 RBC ANTIBODY SCREEN: CPT

## 2024-04-11 PROCEDURE — 86900 BLOOD TYPING SEROLOGIC ABO: CPT

## 2024-04-11 PROCEDURE — 85025 COMPLETE CBC W/AUTO DIFF WBC: CPT

## 2024-04-11 PROCEDURE — 85014 HEMATOCRIT: CPT

## 2024-04-11 PROCEDURE — 59050 FETAL MONITOR W/REPORT: CPT

## 2024-04-11 PROCEDURE — T1013: CPT

## 2024-04-11 PROCEDURE — 85018 HEMOGLOBIN: CPT

## 2024-04-11 PROCEDURE — 86901 BLOOD TYPING SEROLOGIC RH(D): CPT

## 2024-04-11 PROCEDURE — 86780 TREPONEMA PALLIDUM: CPT

## 2024-04-11 RX ORDER — IBUPROFEN 200 MG
1 TABLET ORAL
Qty: 120 | Refills: 0
Start: 2024-04-11 | End: 2024-05-10

## 2024-04-11 RX ORDER — ACETAMINOPHEN 500 MG
3 TABLET ORAL
Qty: 360 | Refills: 0
Start: 2024-04-11 | End: 2024-05-10

## 2024-04-11 RX ADMIN — Medication 975 MILLIGRAM(S): at 08:52

## 2024-04-11 RX ADMIN — Medication 975 MILLIGRAM(S): at 03:29

## 2024-04-11 RX ADMIN — Medication 1 TABLET(S): at 11:53

## 2024-04-11 RX ADMIN — Medication 600 MILLIGRAM(S): at 11:53

## 2024-04-11 NOTE — PROGRESS NOTE ADULT - ASSESSMENT
Post  day # 1  no complaints  exam wnl    Plan  cbc  other routine care  discussed contraception, vaccination, breastfeeding  
A/P:  38y  now PPD#2 s/p spontaneous vaginal delivery at 39 weeks gestation, uncomplicated.    -Vital signs stable  -Hgb: 11.2 -> 9  -Voiding, tolerating PO, bowel function nml   -Advance care as tolerated   -Continue routine postpartum care and education  -Healthy female infant  -Dispo: Pt is stable for discharge to home pending attending approval.

## 2024-04-11 NOTE — DISCHARGE NOTE OB - CARE PROVIDERS DIRECT ADDRESSES
[email or web address]

## 2024-04-11 NOTE — DISCHARGE NOTE OB - BREASTFEEDING PROVIDES MATERNAL HEALTH BENEFITS, DECREASED PREMENOPAUSAL BREAST CANCER, OVARIAN CANCER AND TYPE II DIABETES MELLITUS
Topical Sulfur Applications Counseling: Topical Sulfur Counseling: Patient counseled that this medication may cause skin irritation or allergic reactions.  In the event of skin irritation, the patient was advised to reduce the amount of the drug applied or use it less frequently.   The patient verbalized understanding of the proper use and possible adverse effects of topical sulfur application.  All of the patient's questions and concerns were addressed.
Statement Selected
Yes

## 2024-04-11 NOTE — DISCHARGE NOTE OB - CARE PROVIDER_API CALL
[place]  Obstetrics and Gynecology  [facility] [place] [address]  Phone: [phone]  Fax: [phone]  Follow Up Time: 2 weeks

## 2024-04-11 NOTE — DISCHARGE NOTE OB - PATIENT PORTAL LINK FT
You can access the FollowMyHealth Patient Portal offered by [facility] by registering at the following website: [email or web address]. By joining [facility]’s FollowMyHealth portal, you will also be able to view your health information using other applications (apps) compatible with our system.

## 2024-04-11 NOTE — DISCHARGE NOTE OB - NS MD DC FALL RISK RISK
For information on Fall & Injury Prevention, visit: [email or web address] OR  [email or web address] OR  https://www.cdc.gov/steadi/patient.html

## 2024-04-11 NOTE — DISCHARGE NOTE OB - MEDICATION SUMMARY - MEDICATIONS TO TAKE
I will START or STAY ON the medications listed below when I get home from the hospital:    ibuprofen 600 mg oral tablet  -- 1 tab(s) by mouth every 6 hours as needed for -for moderate pain  -- Do not take this drug if you are pregnant.  It is very important that you take or use this exactly as directed.  Do not skip doses or discontinue unless directed by your doctor.  May cause drowsiness or dizziness.  Obtain medical advice before taking any non-prescription drugs as some may affect the action of this medication.  Take with food or milk.  -- Indication: For Pain    acetaminophen 325 mg oral tablet  -- 3 tab(s) by mouth every 6 hours  -- Indication: For Pain

## 2024-04-11 NOTE — PROGRESS NOTE ADULT - ATTENDING COMMENTS
Patient seen and examined, no complaints, normal lochia  VS and labs reviewed  abd: fundus firm/NT  PPD#2 s/p  -stable   cleared for discharge home today  f/u instructions reviewed

## 2024-04-11 NOTE — PROGRESS NOTE ADULT - SUBJECTIVE AND OBJECTIVE BOX
ARACELI WHITE is a 38y  now PPD#2 s/p spontaneous vaginal delivery at 39 weeks gestation, uncomplicated.    S:    The patient has no complaints.  Pain controlled with current treatment regimen.   She is ambulating without difficulty and tolerating PO.   + flatus/-BM/+ voiding   She endorses appropriate lochia, which is decreasing.   She is breastfeeding without difficulty.   Denies HA, CP,SOB, leg pain    O:    T(C): 36.5 (04-10-24 @ 16:16), Max: 36.6 (04-10-24 @ 04:05)  HR: 86 (04-10-24 @ 16:16) (86 - 87)  BP: 119/79 (04-10-24 @ 16:16) (119/79 - 129/79)  RR: 18 (04-10-24 @ 16:16) (18 - 18)  SpO2: 97% (04-10-24 @ 16:16) (96% - 97%)    Gen: NAD, AOx3    Breast: Nontender, non-engorged   Abdomen:  Soft, non-tender, non-distended  Uterus:  Fundus firm below umbilicus  VE:  +Lochia  Ext:  Non-tender and non-edematous                          9.0    x     )-----------( x        ( 10 Apr 2024 05:10 )             27.5